# Patient Record
Sex: MALE | Race: WHITE | NOT HISPANIC OR LATINO
[De-identification: names, ages, dates, MRNs, and addresses within clinical notes are randomized per-mention and may not be internally consistent; named-entity substitution may affect disease eponyms.]

---

## 2021-11-19 PROBLEM — Z00.00 ENCOUNTER FOR PREVENTIVE HEALTH EXAMINATION: Status: ACTIVE | Noted: 2021-11-19

## 2021-11-22 ENCOUNTER — APPOINTMENT (OUTPATIENT)
Dept: UROLOGY | Facility: CLINIC | Age: 56
End: 2021-11-22
Payer: COMMERCIAL

## 2021-11-22 VITALS
HEIGHT: 71 IN | WEIGHT: 239 LBS | HEART RATE: 80 BPM | TEMPERATURE: 97.8 F | SYSTOLIC BLOOD PRESSURE: 125 MMHG | DIASTOLIC BLOOD PRESSURE: 80 MMHG | BODY MASS INDEX: 33.46 KG/M2

## 2021-11-22 DIAGNOSIS — R35.1 NOCTURIA: ICD-10-CM

## 2021-11-22 LAB
BILIRUB UR QL STRIP: NORMAL
CLARITY UR: CLEAR
COLLECTION METHOD: NORMAL
GLUCOSE UR-MCNC: NORMAL
HCG UR QL: 0.2 EU/DL
HGB UR QL STRIP.AUTO: NORMAL
KETONES UR-MCNC: NORMAL
LEUKOCYTE ESTERASE UR QL STRIP: NORMAL
NITRITE UR QL STRIP: NORMAL
PH UR STRIP: 6
PROT UR STRIP-MCNC: NORMAL
SP GR UR STRIP: 1.01

## 2021-11-22 PROCEDURE — 81003 URINALYSIS AUTO W/O SCOPE: CPT | Mod: NC,QW

## 2021-11-22 PROCEDURE — 99204 OFFICE O/P NEW MOD 45 MIN: CPT

## 2021-12-08 NOTE — ASSESSMENT
[FreeTextEntry1] : \par =======================================================================================\par ASSESSMENT and PLAN\par \par The patient is a 56 year male with a history of the following:\par \par 1. Nocturia:\par He is not bothered by this but just wanted to make sure that he does not have anything dangerous.\par He is not interested in medicine at this time.\par \par 2. Discomfort after ejaculation\par His DAFNE, though limited was normal. I have asked him to do a semen culture.\par \par 3. Umbilical hernia\par He will see a surgeon if it causes him pain.\par \par \par -----------------------------------------------------------------------------------------------------\par LABS/TESTS Ordered: Semen culture, RBUs with PVr and prostate size\par Meds Ordered:\par Follow up: one year or PRN\par -----------------------------------------------------------------------------------------------------\par \par Greater than 50% of this 45 minute visit was spent counseling the patient and coordinating care.\par \par Thank you for allowing me to assist in the care of your patient. Should you have any questions please do not hesitate to reach out to me.\par \par \par Gela Ordoñez MD\par Associate \par Department of Urology\par [facility]\par Phone: [phone]\par Fax: [phone]\par \par [address]\par [facility] [address]\par

## 2021-12-08 NOTE — ADDENDUM
[FreeTextEntry1] : 11/22/2021: Bladder sono\par Prostate 17gm, bladder with PVR 47cc, bladder normal with BL jets and no abnormalities seen\par Kidney sono\par Normal kidneys with 1.2cm exophytic LLP cyst. Normal flow.\par No stones, mass or hydro

## 2021-12-08 NOTE — HISTORY OF PRESENT ILLNESS
[FreeTextEntry1] : Language: English\par Date of First visit: 11/22/2021\par Accompanied by: *Self\par Contact info: [phone]\par Referring Provider/PCP: Lacey\par \par \par \par CC/ Problem List:\par Urinary frequency\par \par ===============================================================================\par FIRST VISIT:\par The patient is a 56 year male who first presents 11/22/2021 for urinary frequency.\par he was drinking a lot of beer but he tried to cut down.\par He has nocturia 3-4 times per night. He feels he voids a "nice" amount every time he gets up. \par He has had that since his 40's. He denies straining to void. He feels fluids go straight through him. \par He feels his stream is average. he denies hematuria, dysuria. He thinks his stream splits a bit. \par He denies hesitancy. He drinks 2 cups of coffee per day at least. He also drinks a lot of herbal tea. He is trying to drink less beer. He does not really eat spicy foods.\par He denies constipation. He has no h/o STD. He has no h/o  trauma or surgery. He does not think he has a FH of kidney or prostate issues. He also feels a little discomfort for a few days after he ejaculates.\par \par \par -------------------------------------------------------------------------------------------\par INTERVAL VISITS:\par \par \par ===============================================================================\par \par PMH: Denies\par PSH: Denies\par POBH: (if applicable)\par FH: DM in dad\par \par ALL: NKDA\par MEDS: Flonase, Allegra PRN\par SOC: Smoked cigars for a while; Beer drinker, denies illegal drugs\par \par \par ROS: Review of Systems is as per HPI unless otherwise denoted below\par \par \par ===============================================================================\par DATA: \par \par LABS:-------------------------------------------------------------------------------------------------------------------\par 11/13/2021: PSA 1.58, sCre 0.93\par 11/22/2021: UA negative\par \par \par RADS:-------------------------------------------------------------------------------------------------------------------\par \par \par \par PATHOLOGY/CYTOLOGY:-------------------------------------------------------------------------------------------\par \par \par \par VOIDING STUDIES: ----------------------------------------------------------------------------------------------------\par 11/22/2021: PVR 16cc\par \par \par STONE STUDIES: (Analysis/LLSA)----------------------------------------------------------------------------------\par \par \par \par PROCEDURES: -----------------------------------------------------------------------------------------------\par \par \par \par \par ===============================================================================\par \par \par PHYSICAL EXAM:\par \par GEN: AAOx3, NAD; Habitus: OW\par \par BARRIERS to CARE: None\par \par PSYCH: Appropriate Behavior, Affect Congruent\par \par HEENT: AT/NC Trachea midline. EOMI.\par \par Lungs: No labored breathing.\par \par NEURO: + Movement, all 4 extremities grossly intact without deficits. No tremors.\par \par SKIN: Warm dry. No visible rashes or ulcers\par \par GAIT: Gait mildly antalgic, Stability good\par \par ABD: Soft, NT ND. No rebound, No guarding. No masses.\par \par No suprapubic tenderness, + umbilical hernia\par \par GROIN: No palpable hernias\par \par MSK: No CVAT BL\par \par EXTR: No clubbing, cyanosis or edema. Normal hair distribution. No venous stasis changes.\par \par LAD: No palpable pre-auricular, submental, submandibular, or cervical LAD. \par \par  FOCUSED: -------------------------------------------------------------------------------------------------\par \par Penis: Normal circ phallus. No lesions, tenderness, curvature, plaques.\par \par Meatus: No Stenosis, Orthotopic.\par \par Testes: Descended bilaterally. Non tender, no palpable masses.\par \par Epididymi: No palpable epididymal masses.\par \par Scrotum: Scrotal wall normal. No intrascrotal, extratesticular lesions. No palpable VV. No palpable hydroceles.\par \par Groin: No palpable inguinal hernias. No adenopathy.\par \par Prostate: (date 11/22/2021) 30+ grams. Smooth. No nodules. Symmetric. No tenderness, No Fluctuance. (could not palpate entire prostate due to habitus)\par DAFNE: No hemorrhoids. Normal tone.\par =======================================================================================\par \par \par

## 2022-11-28 ENCOUNTER — APPOINTMENT (OUTPATIENT)
Dept: UROLOGY | Facility: CLINIC | Age: 57
End: 2022-11-28